# Patient Record
Sex: MALE | Race: WHITE | Employment: FULL TIME | URBAN - METROPOLITAN AREA
[De-identification: names, ages, dates, MRNs, and addresses within clinical notes are randomized per-mention and may not be internally consistent; named-entity substitution may affect disease eponyms.]

---

## 2017-02-07 PROCEDURE — 87086 URINE CULTURE/COLONY COUNT: CPT | Performed by: OBSTETRICS & GYNECOLOGY

## 2017-02-07 PROCEDURE — 81003 URINALYSIS AUTO W/O SCOPE: CPT | Performed by: INTERNAL MEDICINE

## 2017-09-22 ENCOUNTER — HOSPITAL ENCOUNTER (OUTPATIENT)
Age: 47
Discharge: HOME | End: 2017-09-22
Attending: EMERGENCY MEDICINE
Payer: COMMERCIAL

## 2017-09-22 VITALS
SYSTOLIC BLOOD PRESSURE: 137 MMHG | DIASTOLIC BLOOD PRESSURE: 81 MMHG | HEART RATE: 88 BPM | OXYGEN SATURATION: 98 % | RESPIRATION RATE: 18 BRPM | BODY MASS INDEX: 28.14 KG/M2 | HEIGHT: 69 IN | TEMPERATURE: 98 F | WEIGHT: 190 LBS

## 2017-09-22 DIAGNOSIS — T78.40XA ALLERGIC REACTION, INITIAL ENCOUNTER: Primary | ICD-10-CM

## 2017-09-22 PROCEDURE — 99214 OFFICE O/P EST MOD 30 MIN: CPT

## 2017-09-22 PROCEDURE — 99213 OFFICE O/P EST LOW 20 MIN: CPT

## 2017-09-22 RX ORDER — PREDNISONE 20 MG/1
40 TABLET ORAL DAILY
Qty: 10 TABLET | Refills: 0 | Status: SHIPPED | OUTPATIENT
Start: 2017-09-22 | End: 2017-09-27

## 2017-09-22 NOTE — ED PROVIDER NOTES
Patient Seen in: [facility]    History   Patient presents with:  Rash Skin Problem (integumentary)    Stated Complaint: RASH    HPI    This patient complains of a generalized rash which has occurred in the past day.   Mindy (36.5 °C)  Temp src: Oral  SpO2: 98 %  O2 Device: None (Room air)    Current:/81   Pulse 88   Temp 97.7 °F (36.5 °C) (Oral)   Resp 18   Ht 175.3 cm (5' 9\")   Wt 86.2 kg   SpO2 98%   BMI 28.06 kg/m²         Physical Exam    Patient is awake and alert

## 2018-02-27 PROCEDURE — 81003 URINALYSIS AUTO W/O SCOPE: CPT | Performed by: INTERNAL MEDICINE

## 2018-08-30 ENCOUNTER — APPOINTMENT (OUTPATIENT)
Dept: GENERAL RADIOLOGY | Facility: HOSPITAL | Age: 48
End: 2018-08-30
Attending: EMERGENCY MEDICINE
Payer: COMMERCIAL

## 2018-08-30 ENCOUNTER — HOSPITAL ENCOUNTER (OUTPATIENT)
Facility: HOSPITAL | Age: 48
Setting detail: OBSERVATION
Discharge: HOME | End: 2018-08-31
Attending: EMERGENCY MEDICINE | Admitting: HOSPITALIST
Payer: COMMERCIAL

## 2018-08-30 DIAGNOSIS — R77.8 ELEVATED TROPONIN: ICD-10-CM

## 2018-08-30 DIAGNOSIS — R07.9 ACUTE CHEST PAIN: Primary | ICD-10-CM

## 2018-08-30 PROBLEM — R79.89 ELEVATED TROPONIN: Status: ACTIVE | Noted: 2018-08-30

## 2018-08-30 LAB
ANION GAP SERPL CALC-SCNC: 10 MMOL/L (ref 0–18)
BASOPHILS # BLD: 0 K/UL (ref 0–0.2)
BASOPHILS NFR BLD: 1 %
BUN SERPL-MCNC: 12 MG/DL (ref 8–20)
BUN/CREAT SERPL: 11 (ref 10–20)
CALCIUM SERPL-MCNC: 9.6 MG/DL (ref 8.5–10.5)
CHLORIDE SERPL-SCNC: 104 MMOL/L (ref 95–110)
CHOLEST SERPL-MCNC: 172 MG/DL (ref 110–200)
CO2 SERPL-SCNC: 24 MMOL/L (ref 22–32)
CREAT SERPL-MCNC: 1.09 MG/DL (ref 0.5–1.5)
EOSINOPHIL # BLD: 0.1 K/UL (ref 0–0.7)
EOSINOPHIL NFR BLD: 1 %
ERYTHROCYTE [DISTWIDTH] IN BLOOD BY AUTOMATED COUNT: 13.5 % (ref 11–15)
GLUCOSE SERPL-MCNC: 136 MG/DL (ref 70–99)
HCT VFR BLD AUTO: 48 % (ref 41–52)
HDLC SERPL-MCNC: 46 MG/DL
HGB BLD-MCNC: 16.3 G/DL (ref 13.5–17.5)
LDLC SERPL CALC-MCNC: 99 MG/DL (ref 0–99)
LYMPHOCYTES # BLD: 2.1 K/UL (ref 1–4)
LYMPHOCYTES NFR BLD: 31 %
MCH RBC QN AUTO: 29.4 PG (ref 27–32)
MCHC RBC AUTO-ENTMCNC: 33.9 G/DL (ref 32–37)
MCV RBC AUTO: 86.9 FL (ref 80–100)
MONOCYTES # BLD: 0.5 K/UL (ref 0–1)
MONOCYTES NFR BLD: 7 %
NEUTROPHILS # BLD AUTO: 4.2 K/UL (ref 1.8–7.7)
NEUTROPHILS NFR BLD: 60 %
NONHDLC SERPL-MCNC: 126 MG/DL
OSMOLALITY UR CALC.SUM OF ELEC: 288 MOSM/KG (ref 275–295)
PLATELET # BLD AUTO: 216 K/UL (ref 140–400)
PMV BLD AUTO: 8.2 FL (ref 7.4–10.3)
POTASSIUM SERPL-SCNC: 3.8 MMOL/L (ref 3.3–5.1)
RBC # BLD AUTO: 5.52 M/UL (ref 4.5–5.9)
SODIUM SERPL-SCNC: 138 MMOL/L (ref 136–144)
TRIGL SERPL-MCNC: 134 MG/DL (ref 1–149)
TROPONIN I SERPL-MCNC: 0.03 NG/ML (ref ?–0.03)
TROPONIN I SERPL-MCNC: 0.04 NG/ML (ref ?–0.03)
WBC # BLD AUTO: 6.9 K/UL (ref 4–11)

## 2018-08-30 PROCEDURE — 99285 EMERGENCY DEPT VISIT HI MDM: CPT

## 2018-08-30 PROCEDURE — 93010 ELECTROCARDIOGRAM REPORT: CPT | Performed by: EMERGENCY MEDICINE

## 2018-08-30 PROCEDURE — 80048 BASIC METABOLIC PNL TOTAL CA: CPT | Performed by: EMERGENCY MEDICINE

## 2018-08-30 PROCEDURE — 84484 ASSAY OF TROPONIN QUANT: CPT | Performed by: EMERGENCY MEDICINE

## 2018-08-30 PROCEDURE — 71046 X-RAY EXAM CHEST 2 VIEWS: CPT | Performed by: EMERGENCY MEDICINE

## 2018-08-30 PROCEDURE — 83036 HEMOGLOBIN GLYCOSYLATED A1C: CPT | Performed by: HOSPITALIST

## 2018-08-30 PROCEDURE — 93005 ELECTROCARDIOGRAM TRACING: CPT

## 2018-08-30 PROCEDURE — 80061 LIPID PANEL: CPT | Performed by: EMERGENCY MEDICINE

## 2018-08-30 PROCEDURE — 85025 COMPLETE CBC W/AUTO DIFF WBC: CPT | Performed by: EMERGENCY MEDICINE

## 2018-08-30 PROCEDURE — 36415 COLL VENOUS BLD VENIPUNCTURE: CPT

## 2018-08-30 RX ORDER — SODIUM CHLORIDE 0.9 % (FLUSH) 0.9 %
3 SYRINGE (ML) INJECTION AS NEEDED
Status: DISCONTINUED | OUTPATIENT
Start: 2018-08-30 | End: 2018-08-31

## 2018-08-30 RX ORDER — PANTOPRAZOLE SODIUM 20 MG/1
20 TABLET, DELAYED RELEASE ORAL
Status: DISCONTINUED | OUTPATIENT
Start: 2018-08-31 | End: 2018-08-31

## 2018-08-30 RX ORDER — METOCLOPRAMIDE HYDROCHLORIDE 5 MG/ML
10 INJECTION INTRAMUSCULAR; INTRAVENOUS EVERY 8 HOURS PRN
Status: DISCONTINUED | OUTPATIENT
Start: 2018-08-30 | End: 2018-08-31

## 2018-08-30 RX ORDER — ACETAMINOPHEN 325 MG/1
650 TABLET ORAL EVERY 6 HOURS PRN
Status: DISCONTINUED | OUTPATIENT
Start: 2018-08-30 | End: 2018-08-31

## 2018-08-30 RX ORDER — ONDANSETRON 2 MG/ML
4 INJECTION INTRAMUSCULAR; INTRAVENOUS EVERY 6 HOURS PRN
Status: DISCONTINUED | OUTPATIENT
Start: 2018-08-30 | End: 2018-08-31

## 2018-08-30 RX ORDER — LISINOPRIL 10 MG/1
10 TABLET ORAL DAILY
Status: DISCONTINUED | OUTPATIENT
Start: 2018-08-31 | End: 2018-08-31

## 2018-08-30 RX ORDER — ASPIRIN 81 MG/1
81 TABLET, CHEWABLE ORAL DAILY
Status: DISCONTINUED | OUTPATIENT
Start: 2018-08-30 | End: 2018-08-31

## 2018-08-30 RX ORDER — ATORVASTATIN CALCIUM 40 MG/1
40 TABLET, FILM COATED ORAL NIGHTLY
Status: DISCONTINUED | OUTPATIENT
Start: 2018-08-30 | End: 2018-08-31

## 2018-08-30 RX ORDER — ASPIRIN 81 MG/1
324 TABLET, CHEWABLE ORAL ONCE
Status: COMPLETED | OUTPATIENT
Start: 2018-08-30 | End: 2018-08-30

## 2018-08-30 NOTE — ED PROVIDER NOTES
Patient Seen in: [facility] AND [facility] Emergency Department    History   Patient presents with:  Dizziness (neurologic)  Chest Pressure    Stated Complaint: Dizzy, chest pressure     HPI    Patient presents to the emergency department with complaint of today History   Problem Relation Age of Onset   • Cancer Father    • Hypertension Brother        Smoking status: Former Smoker                                                              Packs/day: 0.00      Years: 0.00         Types: Cigarettes     Quit date: extremities equally with good coordination. No cranial nerve asymmetry noted. Psychiatric:  Normal affect. Oriented. No unusual behavior. Interacting well.     It appears well at this time he is a non-smoker has history of hypertension reflux we will d health screening including reassessment of your blood pressure. Clinical Impression:  Acute chest pain  (primary encounter diagnosis)  Elevated troponin    Disposition:  There is no disposition on file for this visit.     Follow-up:  No follow-up provi

## 2018-08-30 NOTE — ED NOTES
Pt remains sitting on the bedside with cardiac monitoring. Pt denies having any complaints at this time.

## 2018-08-30 NOTE — ED INITIAL ASSESSMENT (HPI)
Pt to rm 40 in [facility], from triage and triaged at bedside for complaint of feeling dizzy when he bent over and then felt a sharp pain in chest, on arrival and on assessment pt denies dizziness and chest pain, states \"it was just a weird feeling I've never f

## 2018-08-30 NOTE — H&P
[facility] Hospitalist H&P     CC: Patient presents with:  Dizziness (neurologic)  Chest Pressure     PCP: Adam Dow MD    ASSESSMENT / PLAN:     Mr. Pati Espinoza is 51 yo M with PMH of HTN, GERD who presented with CP.     Atypical CP  - troponin mildly elevat prescriptions have been marked as taking for the 8/30/18 encounter [facility] Encounter).       Soc Hx     Smoking status: Former Smoker     Types: Cigarettes    Quit date: 4/5/2008    Smokeless tobacco: Never Used    Alcohol use Yes  3.6 oz/week    6 Cans o Dictated by (CST): Lalo Mayen MD on 8/30/2018 at 14:11     Approved by (CST): Lalo Mayen MD on 8/30/2018 at 14:13

## 2018-08-30 NOTE — H&P
ASSESSMENT/PLAN:    Impression: 1. Chest pressure in a 77-year-old male with a few risk factors and troponins are upper limits of normal.  2.  Hypertension    Recommendation is going be admitted. We are going to start him on aspirin and beta blockers. mg total) by mouth every morning before breakfast. Before meal Disp: 90 tablet Rfl: 3   lisinopril 10 MG Oral Tab TAKE 1 BY MOUTH DAILY Disp: 90 tablet Rfl: 3   clotrimazole 10 MG Mouth/Throat Jeremiah Take 1 tablet (10 mg total) by mouth 5 (five) times brigette SKIN:no rashes,lesions. NEURO/PSYCH:alert and oriented. Normal affect. CV: PALP:PMI not displaced; no lifts, thrills or rubs. AUSC: Regular rhythm, normal S1, S2 without S3; no murmur. CAROTIDS:carotid pulses normal. ABD AORTA:not enlarged.  Sherryn Cooks

## 2018-08-31 ENCOUNTER — APPOINTMENT (OUTPATIENT)
Dept: NUCLEAR MEDICINE | Facility: HOSPITAL | Age: 48
End: 2018-08-31
Attending: HOSPITALIST
Payer: COMMERCIAL

## 2018-08-31 ENCOUNTER — APPOINTMENT (OUTPATIENT)
Dept: CV DIAGNOSTICS | Facility: HOSPITAL | Age: 48
End: 2018-08-31
Attending: HOSPITALIST
Payer: COMMERCIAL

## 2018-08-31 VITALS
SYSTOLIC BLOOD PRESSURE: 117 MMHG | BODY MASS INDEX: 31.12 KG/M2 | OXYGEN SATURATION: 96 % | WEIGHT: 205.31 LBS | RESPIRATION RATE: 15 BRPM | DIASTOLIC BLOOD PRESSURE: 73 MMHG | HEART RATE: 62 BPM | HEIGHT: 68 IN | TEMPERATURE: 98 F

## 2018-08-31 PROBLEM — R73.03 PREDIABETES: Status: ACTIVE | Noted: 2018-08-31

## 2018-08-31 LAB
ANION GAP SERPL CALC-SCNC: 7 MMOL/L (ref 0–18)
BASOPHILS # BLD: 0.1 K/UL (ref 0–0.2)
BASOPHILS NFR BLD: 1 %
BUN SERPL-MCNC: 15 MG/DL (ref 8–20)
BUN/CREAT SERPL: 14.7 (ref 10–20)
CALCIUM SERPL-MCNC: 9.3 MG/DL (ref 8.5–10.5)
CHLORIDE SERPL-SCNC: 101 MMOL/L (ref 95–110)
CO2 SERPL-SCNC: 27 MMOL/L (ref 22–32)
CREAT SERPL-MCNC: 1.02 MG/DL (ref 0.5–1.5)
EOSINOPHIL # BLD: 0.2 K/UL (ref 0–0.7)
EOSINOPHIL NFR BLD: 3 %
ERYTHROCYTE [DISTWIDTH] IN BLOOD BY AUTOMATED COUNT: 13.3 % (ref 11–15)
GLUCOSE SERPL-MCNC: 104 MG/DL (ref 70–99)
HBA1C MFR BLD: 5.7 % (ref 4–6)
HCT VFR BLD AUTO: 46.6 % (ref 41–52)
HGB BLD-MCNC: 15.7 G/DL (ref 13.5–17.5)
LYMPHOCYTES # BLD: 3.4 K/UL (ref 1–4)
LYMPHOCYTES NFR BLD: 44 %
MCH RBC QN AUTO: 29.5 PG (ref 27–32)
MCHC RBC AUTO-ENTMCNC: 33.8 G/DL (ref 32–37)
MCV RBC AUTO: 87.3 FL (ref 80–100)
MONOCYTES # BLD: 0.7 K/UL (ref 0–1)
MONOCYTES NFR BLD: 9 %
NEUTROPHILS # BLD AUTO: 3.4 K/UL (ref 1.8–7.7)
NEUTROPHILS NFR BLD: 44 %
OSMOLALITY UR CALC.SUM OF ELEC: 281 MOSM/KG (ref 275–295)
PLATELET # BLD AUTO: 223 K/UL (ref 140–400)
PMV BLD AUTO: 8.3 FL (ref 7.4–10.3)
POTASSIUM SERPL-SCNC: 3.9 MMOL/L (ref 3.3–5.1)
RBC # BLD AUTO: 5.33 M/UL (ref 4.5–5.9)
SODIUM SERPL-SCNC: 135 MMOL/L (ref 136–144)
TROPONIN I SERPL-MCNC: 0.01 NG/ML (ref ?–0.03)
WBC # BLD AUTO: 7.7 K/UL (ref 4–11)

## 2018-08-31 PROCEDURE — 93017 CV STRESS TEST TRACING ONLY: CPT | Performed by: HOSPITALIST

## 2018-08-31 PROCEDURE — 84484 ASSAY OF TROPONIN QUANT: CPT | Performed by: HOSPITALIST

## 2018-08-31 PROCEDURE — 78452 HT MUSCLE IMAGE SPECT MULT: CPT | Performed by: HOSPITALIST

## 2018-08-31 PROCEDURE — 80048 BASIC METABOLIC PNL TOTAL CA: CPT | Performed by: HOSPITALIST

## 2018-08-31 PROCEDURE — 93016 CV STRESS TEST SUPVJ ONLY: CPT | Performed by: HOSPITALIST

## 2018-08-31 PROCEDURE — 85025 COMPLETE CBC W/AUTO DIFF WBC: CPT | Performed by: HOSPITALIST

## 2018-08-31 PROCEDURE — 93018 CV STRESS TEST I&R ONLY: CPT | Performed by: HOSPITALIST

## 2018-08-31 RX ORDER — SODIUM CHLORIDE 9 MG/ML
INJECTION, SOLUTION INTRAVENOUS
Status: COMPLETED
Start: 2018-08-31 | End: 2018-08-31

## 2018-08-31 NOTE — DISCHARGE SUMMARY
General Medicine Discharge Summary     Patient ID:  Lisa Ribera  50year old  1/26/1970    Admit date: 8/30/2018    Discharge date and time: 08/31/18    Attending Physician: Sol Dillard MD    Primary Care Physician:  Robson Loja MD     Reason f by (CST): Silverio Boucher MD on 8/30/2018 at 14:11     Approved by (CST): Silverio Boucher MD on 8/30/2018 at 14:13            Disposition: home    Home Medication Changes:          Medication List      CONTINUE taking these medications    CALCIUM 600 1500 (6

## 2018-08-31 NOTE — PROGRESS NOTES
ASSESSMENT/PLAN:     imp:  1. Elevated trop; now normal with normal stess  rec no further cardiac rec.  Ok to [employer]  Please call with any questions    [unfilled]      ------------------------------------------------------------------------------------- (1.727 m)   Wt 205 lb 4.8 oz (93.1 kg)   SpO2 96%   BMI 31.22 kg/m²   Wt Readings from Last 3 Encounters:  08/30/18 : 205 lb 4.8 oz (93.1 kg)  02/27/18 : 201 lb 3.2 oz (91.3 kg)  09/22/17 : 190 lb (86.2 kg)    CONS: well developed, well nourished.  WEIGHT:d

## 2018-10-26 ENCOUNTER — HOSPITAL ENCOUNTER (OUTPATIENT)
Age: 48
Discharge: HOME | End: 2018-10-26
Payer: COMMERCIAL

## 2018-10-26 VITALS
BODY MASS INDEX: 30.36 KG/M2 | HEIGHT: 69 IN | HEART RATE: 81 BPM | DIASTOLIC BLOOD PRESSURE: 79 MMHG | WEIGHT: 205 LBS | OXYGEN SATURATION: 97 % | RESPIRATION RATE: 18 BRPM | TEMPERATURE: 98 F | SYSTOLIC BLOOD PRESSURE: 142 MMHG

## 2018-10-26 DIAGNOSIS — J06.9 VIRAL URI WITH COUGH: Primary | ICD-10-CM

## 2018-10-26 PROCEDURE — 87430 STREP A AG IA: CPT

## 2018-10-26 PROCEDURE — 99212 OFFICE O/P EST SF 10 MIN: CPT

## 2018-10-26 PROCEDURE — 99213 OFFICE O/P EST LOW 20 MIN: CPT

## 2018-10-26 NOTE — ED PROVIDER NOTES
Patient Seen in: [facility]    History   Patient presents with:  Cough/URI    Stated Complaint: Sore Throat/Congestion    HPI    Patient is a 68-year-old male who presents for evaluation of sore throat, cough and congesti other systems reviewed and negative except as noted above.     Physical Exam     ED Triage Vitals [10/26/18 0900]   /79   Pulse 81   Resp 18   Temp 98 °F (36.7 °C)   Temp src Oral   SpO2 97 %   O2 Device None (Room air)       Current:/79   Pulse MD  [address] [facility] [address]  [phone]    Schedule an appointment as soon as possible for a visit   2-3 days or go to ER for worsening symptoms        Medications Prescribed:  Current Discharge Medication List

## 2018-10-26 NOTE — ED INITIAL ASSESSMENT (HPI)
PATIENT REPORTS PAINFUL SWALLOW, PRODUCTIVE COUGH X 2 DAYS. DENIES FEVERS AT HOME. PATIENT TOOK OTC DAYQUIL AND NYQUIL WITHOUT SIGNIFICANT RELIEF IN SYMPTOMS.

## 2019-12-11 PROBLEM — M25.522 LEFT ELBOW PAIN: Status: ACTIVE | Noted: 2019-12-11

## 2020-02-02 ENCOUNTER — HOSPITAL ENCOUNTER (OUTPATIENT)
Age: 50
Discharge: HOME | End: 2020-02-02
Payer: COMMERCIAL

## 2020-02-02 VITALS
TEMPERATURE: 98 F | DIASTOLIC BLOOD PRESSURE: 79 MMHG | HEART RATE: 64 BPM | BODY MASS INDEX: 30 KG/M2 | SYSTOLIC BLOOD PRESSURE: 123 MMHG | WEIGHT: 205 LBS | RESPIRATION RATE: 18 BRPM | OXYGEN SATURATION: 99 %

## 2020-02-02 DIAGNOSIS — H00.012 HORDEOLUM EXTERNUM OF RIGHT LOWER EYELID: Primary | ICD-10-CM

## 2020-02-02 PROCEDURE — 99213 OFFICE O/P EST LOW 20 MIN: CPT

## 2020-02-02 RX ORDER — POLYMYXIN B SULFATE AND TRIMETHOPRIM 1; 10000 MG/ML; [USP'U]/ML
1 SOLUTION OPHTHALMIC
Qty: 10 ML | Refills: 0 | Status: SHIPPED | OUTPATIENT
Start: 2020-02-02 | End: 2020-02-07

## 2020-02-02 NOTE — ED INITIAL ASSESSMENT (HPI)
Pt to IC with redness and irritation to right eye starting yesterday. Pt states he 'feels like he has something in his eye'. Denies itching. Eye with occasional yellow colored drainage.

## 2020-02-02 NOTE — ED PROVIDER NOTES
Patient presents with:  Eye Problem      HPI:     Omari Jain is a 48year old male with a history of HTN, GERD presents with a chief complaint R eye redness and drainage which started yesterday. Also c/o foreign body sensation to the eye.  States he al right lower eyelid H00.012        All results reviewed and discussed with patient. See AVS for detailed discharge instructions for your condition today.     Follow Up with:  Dolly Fabry, [address]  [phone]

## 2020-03-09 ENCOUNTER — HOSPITAL ENCOUNTER (OUTPATIENT)
Age: 50
Discharge: HOME | End: 2020-03-09
Attending: EMERGENCY MEDICINE
Payer: COMMERCIAL

## 2020-03-09 ENCOUNTER — APPOINTMENT (OUTPATIENT)
Dept: GENERAL RADIOLOGY | Age: 50
End: 2020-03-09
Attending: EMERGENCY MEDICINE
Payer: COMMERCIAL

## 2020-03-09 VITALS
OXYGEN SATURATION: 97 % | BODY MASS INDEX: 29.62 KG/M2 | HEIGHT: 69 IN | RESPIRATION RATE: 20 BRPM | SYSTOLIC BLOOD PRESSURE: 119 MMHG | WEIGHT: 200 LBS | TEMPERATURE: 99 F | HEART RATE: 105 BPM | DIASTOLIC BLOOD PRESSURE: 67 MMHG

## 2020-03-09 DIAGNOSIS — J20.8 ACUTE VIRAL BRONCHITIS: Primary | ICD-10-CM

## 2020-03-09 DIAGNOSIS — J06.9 VIRAL UPPER RESPIRATORY TRACT INFECTION WITH COUGH: ICD-10-CM

## 2020-03-09 LAB
POCT INFLUENZA A: NEGATIVE
POCT INFLUENZA B: NEGATIVE

## 2020-03-09 PROCEDURE — 71046 X-RAY EXAM CHEST 2 VIEWS: CPT | Performed by: EMERGENCY MEDICINE

## 2020-03-09 PROCEDURE — 99214 OFFICE O/P EST MOD 30 MIN: CPT

## 2020-03-09 PROCEDURE — 99213 OFFICE O/P EST LOW 20 MIN: CPT

## 2020-03-09 PROCEDURE — 87502 INFLUENZA DNA AMP PROBE: CPT | Performed by: EMERGENCY MEDICINE

## 2020-03-09 NOTE — ED PROVIDER NOTES
Patient Seen in: [facility]      History   Patient presents with:  Viral Syndrome    Stated Complaint: cough, chest congestion, itchy throat, runny nose and low grade fever    HPI    The patient is a 70-year-old male with HPI.  Constitutional and vital signs reviewed. All other systems reviewed and negative except as noted above.     Physical Exam     ED Triage Vitals [03/09/20 1229]   /67   Pulse 105   Resp 20   Temp 99.3 °F (37.4 °C)   Temp src Oral   SpO2 97 % week  For Recheck        Medications Prescribed:  Current Discharge Medication List

## 2020-12-28 PROBLEM — R79.89 ELEVATED TROPONIN: Status: RESOLVED | Noted: 2018-08-30 | Resolved: 2020-12-28

## 2020-12-28 PROBLEM — R07.9 ACUTE CHEST PAIN: Status: RESOLVED | Noted: 2018-08-30 | Resolved: 2020-12-28

## 2020-12-28 PROBLEM — R77.8 ELEVATED TROPONIN: Status: RESOLVED | Noted: 2018-08-30 | Resolved: 2020-12-28

## 2021-11-20 ENCOUNTER — HOSPITAL ENCOUNTER (OUTPATIENT)
Age: 51
Discharge: HOME | End: 2021-11-20
Payer: COMMERCIAL

## 2021-11-20 VITALS
DIASTOLIC BLOOD PRESSURE: 79 MMHG | RESPIRATION RATE: 18 BRPM | TEMPERATURE: 98 F | OXYGEN SATURATION: 98 % | HEART RATE: 70 BPM | SYSTOLIC BLOOD PRESSURE: 125 MMHG

## 2021-11-20 DIAGNOSIS — R05.9 COUGH: Primary | ICD-10-CM

## 2021-11-20 PROCEDURE — 99213 OFFICE O/P EST LOW 20 MIN: CPT

## 2021-11-20 RX ORDER — FLUTICASONE PROPIONATE 50 MCG
2 SPRAY, SUSPENSION (ML) NASAL DAILY
Qty: 16 G | Refills: 0 | Status: SHIPPED | OUTPATIENT
Start: 2021-11-20 | End: 2021-12-20

## 2021-11-20 RX ORDER — CODEINE PHOSPHATE AND GUAIFENESIN 10; 100 MG/5ML; MG/5ML
5 SOLUTION ORAL EVERY 6 HOURS PRN
Qty: 180 ML | Refills: 0 | Status: SHIPPED | OUTPATIENT
Start: 2021-11-20

## 2021-11-20 NOTE — ED PROVIDER NOTES
Patient Seen in: [facility]      History   Patient presents with:  Cough/URI    Stated Complaint: cough    Subjective:   HPI    45 yo male otherwise healthy arrives to the ic with co cough, initially with pnd and congestion now coughing at ni Vitals [11/20/21 0818]   /79   Pulse 70   Resp 18   Temp 97.6 °F (36.4 °C)   Temp src Temporal   SpO2 98 %   O2 Device None (Room air)       Current:/79   Pulse 70   Temp 97.6 °F (36.4 °C) (Temporal)   Resp 18   SpO2 98%         Physical Exam review, obtaining history, performing a physical exam, bedside monitoring of interventions, collecting and independently interpreting tests, discussion with consultants, patient communication/counseling, and chart documentation but not including time spent

## 2022-05-23 ENCOUNTER — HOSPITAL ENCOUNTER (OUTPATIENT)
Age: 52
Discharge: HOME | End: 2022-05-23
Attending: EMERGENCY MEDICINE
Payer: COMMERCIAL

## 2022-05-23 VITALS
RESPIRATION RATE: 18 BRPM | SYSTOLIC BLOOD PRESSURE: 140 MMHG | HEART RATE: 67 BPM | TEMPERATURE: 98 F | DIASTOLIC BLOOD PRESSURE: 88 MMHG | OXYGEN SATURATION: 98 %

## 2022-05-23 DIAGNOSIS — J06.9 VIRAL URI: Primary | ICD-10-CM

## 2022-05-23 LAB — SARS-COV-2 RNA RESP QL NAA+PROBE: NOT DETECTED

## 2022-05-23 PROCEDURE — 99212 OFFICE O/P EST SF 10 MIN: CPT

## 2022-08-11 ENCOUNTER — HOSPITAL ENCOUNTER (OUTPATIENT)
Age: 52
Discharge: HOME | End: 2022-08-11
Attending: EMERGENCY MEDICINE
Payer: COMMERCIAL

## 2022-08-11 VITALS
HEART RATE: 64 BPM | TEMPERATURE: 98 F | RESPIRATION RATE: 18 BRPM | OXYGEN SATURATION: 97 % | SYSTOLIC BLOOD PRESSURE: 131 MMHG | DIASTOLIC BLOOD PRESSURE: 82 MMHG

## 2022-08-11 DIAGNOSIS — T63.481A INSECT STINGS, ACCIDENTAL OR UNINTENTIONAL, INITIAL ENCOUNTER: Primary | ICD-10-CM

## 2022-08-11 PROCEDURE — 99213 OFFICE O/P EST LOW 20 MIN: CPT

## 2022-08-11 PROCEDURE — 99212 OFFICE O/P EST SF 10 MIN: CPT

## 2022-08-11 NOTE — ED INITIAL ASSESSMENT (HPI)
Pt comes in for evaluation of bee sting to forehead yesterday morning. Reports that he ran over a bee nest while mowing. Reports 2 stings to forehead around 9 am. Reports taking a benadryl around noon yesterday. Denies trouble breathing, tongue swelling. Reports mild local itching. Swelling noted to mid forehead and around eyes.

## 2023-03-09 ENCOUNTER — APPOINTMENT (OUTPATIENT)
Dept: CT IMAGING | Age: 53
End: 2023-03-09
Attending: EMERGENCY MEDICINE
Payer: COMMERCIAL

## 2023-03-09 ENCOUNTER — HOSPITAL ENCOUNTER (OUTPATIENT)
Age: 53
Discharge: HOME | End: 2023-03-09
Attending: EMERGENCY MEDICINE
Payer: COMMERCIAL

## 2023-03-09 VITALS
SYSTOLIC BLOOD PRESSURE: 132 MMHG | RESPIRATION RATE: 20 BRPM | TEMPERATURE: 99 F | HEART RATE: 78 BPM | DIASTOLIC BLOOD PRESSURE: 86 MMHG | OXYGEN SATURATION: 99 %

## 2023-03-09 DIAGNOSIS — R31.9 HEMATURIA, UNSPECIFIED TYPE: ICD-10-CM

## 2023-03-09 DIAGNOSIS — K63.89 EPIPLOIC APPENDAGITIS: Primary | ICD-10-CM

## 2023-03-09 LAB
#MXD IC: 0.7 X10ˆ3/UL (ref 0.1–1)
BILIRUB UR QL STRIP: NEGATIVE
BUN BLD-MCNC: 14 MG/DL (ref 7–18)
CHLORIDE BLD-SCNC: 101 MMOL/L (ref 98–112)
CLARITY UR: CLEAR
CO2 BLD-SCNC: 27 MMOL/L (ref 21–32)
COLOR UR: YELLOW
CREAT BLD-MCNC: 1 MG/DL
GFR SERPLBLD BASED ON 1.73 SQ M-ARVRAT: 90 ML/MIN/1.73M2 (ref 60–?)
GLUCOSE BLD-MCNC: 136 MG/DL (ref 70–99)
GLUCOSE UR STRIP-MCNC: NEGATIVE MG/DL
HCT VFR BLD AUTO: 52.4 %
HCT VFR BLD CALC: 55 %
HGB BLD-MCNC: 17.6 G/DL
ISTAT IONIZED CALCIUM FOR CHEM 8: 1.28 MMOL/L (ref 1.12–1.32)
KETONES UR STRIP-MCNC: NEGATIVE MG/DL
LEUKOCYTE ESTERASE UR QL STRIP: NEGATIVE
LYMPHOCYTES # BLD AUTO: 1.9 X10ˆ3/UL (ref 1–4)
LYMPHOCYTES NFR BLD AUTO: 23.6 %
MCH RBC QN AUTO: 28.9 PG (ref 26–34)
MCHC RBC AUTO-ENTMCNC: 33.6 G/DL (ref 31–37)
MCV RBC AUTO: 86 FL (ref 80–100)
MIXED CELL %: 9.3 %
NEUTROPHILS # BLD AUTO: 5.4 X10ˆ3/UL (ref 1.5–7.7)
NEUTROPHILS NFR BLD AUTO: 67.1 %
NITRITE UR QL STRIP: NEGATIVE
PH UR STRIP: 5 [PH]
PLATELET # BLD AUTO: 225 X10ˆ3/UL (ref 150–450)
POTASSIUM BLD-SCNC: 4.3 MMOL/L (ref 3.6–5.1)
PROT UR STRIP-MCNC: NEGATIVE MG/DL
RBC # BLD AUTO: 6.09 X10ˆ6/UL
SODIUM BLD-SCNC: 140 MMOL/L (ref 136–145)
SP GR UR STRIP: 1.02
UROBILINOGEN UR STRIP-ACNC: <2 MG/DL
WBC # BLD AUTO: 8 X10ˆ3/UL (ref 4–11)

## 2023-03-09 PROCEDURE — 74177 CT ABD & PELVIS W/CONTRAST: CPT | Performed by: EMERGENCY MEDICINE

## 2023-03-09 PROCEDURE — 81002 URINALYSIS NONAUTO W/O SCOPE: CPT

## 2023-03-09 PROCEDURE — 99214 OFFICE O/P EST MOD 30 MIN: CPT

## 2023-03-09 PROCEDURE — 80047 BASIC METABLC PNL IONIZED CA: CPT

## 2023-03-09 PROCEDURE — 36415 COLL VENOUS BLD VENIPUNCTURE: CPT

## 2023-03-09 PROCEDURE — 85025 COMPLETE CBC W/AUTO DIFF WBC: CPT | Performed by: EMERGENCY MEDICINE

## 2023-03-09 NOTE — DISCHARGE INSTRUCTIONS
Thank you for visiting our immediate care for your health care needs. Please follow up with your regular doctor in the next 1-2 days. If you have any additional problems please return to the immediate care. Please take Tylenol and or Motrin for pain.

## 2023-03-09 NOTE — ED INITIAL ASSESSMENT (HPI)
Presents with 4 days of LLQ pain. No nausea. No fever. No vomiting or diarrhea. Eating and drinking normally. Denies urinary symptoms.  Also states he just started a new work out program.

## 2025-07-25 ENCOUNTER — APPOINTMENT (OUTPATIENT)
Dept: CV DIAGNOSTICS | Facility: HOSPITAL | Age: 55
End: 2025-07-25
Attending: STUDENT IN AN ORGANIZED HEALTH CARE EDUCATION/TRAINING PROGRAM

## 2025-07-25 ENCOUNTER — HOSPITAL ENCOUNTER (INPATIENT)
Facility: HOSPITAL | Age: 55
LOS: 1 days | Discharge: HOME | End: 2025-07-25
Attending: EMERGENCY MEDICINE | Admitting: STUDENT IN AN ORGANIZED HEALTH CARE EDUCATION/TRAINING PROGRAM

## 2025-07-25 ENCOUNTER — HOSPITAL ENCOUNTER (OUTPATIENT)
Facility: HOSPITAL | Age: 55
Setting detail: OBSERVATION
Discharge: HOME | End: 2025-07-25
Attending: EMERGENCY MEDICINE | Admitting: STUDENT IN AN ORGANIZED HEALTH CARE EDUCATION/TRAINING PROGRAM

## 2025-07-25 VITALS
BODY MASS INDEX: 30.9 KG/M2 | HEIGHT: 69 IN | SYSTOLIC BLOOD PRESSURE: 111 MMHG | RESPIRATION RATE: 18 BRPM | WEIGHT: 208.63 LBS | TEMPERATURE: 97 F | OXYGEN SATURATION: 97 % | DIASTOLIC BLOOD PRESSURE: 76 MMHG | HEART RATE: 76 BPM

## 2025-07-25 DIAGNOSIS — I47.10 SVT (SUPRAVENTRICULAR TACHYCARDIA) (HCC): Primary | ICD-10-CM

## 2025-07-25 LAB
ANION GAP SERPL CALC-SCNC: 10 MMOL/L (ref 0–18)
ATRIAL RATE: 112 BPM
ATRIAL RATE: 166 BPM
BASOPHILS # BLD AUTO: 0.07 X10(3) UL (ref 0–0.2)
BASOPHILS NFR BLD AUTO: 0.8 %
BUN BLD-MCNC: 16 MG/DL (ref 9–23)
BUN/CREAT SERPL: 13.9 (ref 10–20)
CALCIUM BLD-MCNC: 9.8 MG/DL (ref 8.7–10.4)
CHLORIDE SERPL-SCNC: 106 MMOL/L (ref 98–112)
CO2 SERPL-SCNC: 24 MMOL/L (ref 21–32)
CREAT BLD-MCNC: 1.15 MG/DL (ref 0.7–1.3)
DEPRECATED RDW RBC AUTO: 37.4 FL (ref 35.1–46.3)
EGFRCR SERPLBLD CKD-EPI 2021: 75 ML/MIN/1.73M2 (ref 60–?)
EOSINOPHIL # BLD AUTO: 0.19 X10(3) UL (ref 0–0.7)
EOSINOPHIL NFR BLD AUTO: 2.2 %
ERYTHROCYTE [DISTWIDTH] IN BLOOD BY AUTOMATED COUNT: 12.3 % (ref 11–15)
GLUCOSE BLD-MCNC: 128 MG/DL (ref 70–99)
HCT VFR BLD AUTO: 47.7 % (ref 39–53)
HGB BLD-MCNC: 17.1 G/DL (ref 13–17.5)
IMM GRANULOCYTES # BLD AUTO: 0.03 X10(3) UL (ref 0–1)
IMM GRANULOCYTES NFR BLD: 0.3 %
LYMPHOCYTES # BLD AUTO: 3.75 X10(3) UL (ref 1–4)
LYMPHOCYTES NFR BLD AUTO: 42.6 %
MAGNESIUM SERPL-MCNC: 1.8 MG/DL (ref 1.6–2.6)
MCH RBC QN AUTO: 30.3 PG (ref 26–34)
MCHC RBC AUTO-ENTMCNC: 35.8 G/DL (ref 31–37)
MCV RBC AUTO: 84.4 FL (ref 80–100)
MONOCYTES # BLD AUTO: 0.92 X10(3) UL (ref 0.1–1)
MONOCYTES NFR BLD AUTO: 10.4 %
NEUTROPHILS # BLD AUTO: 3.85 X10 (3) UL (ref 1.5–7.7)
NEUTROPHILS # BLD AUTO: 3.85 X10(3) UL (ref 1.5–7.7)
NEUTROPHILS NFR BLD AUTO: 43.7 %
OSMOLALITY SERPL CALC.SUM OF ELEC: 293 MOSM/KG (ref 275–295)
P AXIS: 44 DEGREES
P-R INTERVAL: 168 MS
PLATELET # BLD AUTO: 240 10(3)UL (ref 150–450)
POTASSIUM SERPL-SCNC: 3.8 MMOL/L (ref 3.5–5.1)
Q-T INTERVAL: 284 MS
Q-T INTERVAL: 292 MS
Q-T INTERVAL: 298 MS
QRS DURATION: 72 MS
QRS DURATION: 78 MS
QRS DURATION: 80 MS
QTC CALCULATION (BEZET): 387 MS
QTC CALCULATION (BEZET): 426 MS
QTC CALCULATION (BEZET): 485 MS
R AXIS: 140 DEGREES
R AXIS: 84 DEGREES
R AXIS: 86 DEGREES
RBC # BLD AUTO: 5.65 X10(6)UL (ref 4.3–5.7)
SODIUM SERPL-SCNC: 140 MMOL/L (ref 136–145)
T AXIS: 34 DEGREES
T AXIS: 45 DEGREES
T AXIS: 91 DEGREES
TROPONIN I SERPL HS-MCNC: 8 NG/L (ref ?–53)
VENTRICULAR RATE: 112 BPM
VENTRICULAR RATE: 123 BPM
VENTRICULAR RATE: 166 BPM
WBC # BLD AUTO: 8.8 X10(3) UL (ref 4–11)

## 2025-07-25 PROCEDURE — 93306 TTE W/DOPPLER COMPLETE: CPT | Performed by: STUDENT IN AN ORGANIZED HEALTH CARE EDUCATION/TRAINING PROGRAM

## 2025-07-25 PROCEDURE — 85025 COMPLETE CBC W/AUTO DIFF WBC: CPT | Performed by: EMERGENCY MEDICINE

## 2025-07-25 PROCEDURE — 96375 TX/PRO/DX INJ NEW DRUG ADDON: CPT

## 2025-07-25 PROCEDURE — 99285 EMERGENCY DEPT VISIT HI MDM: CPT

## 2025-07-25 PROCEDURE — 93005 ELECTROCARDIOGRAM TRACING: CPT

## 2025-07-25 PROCEDURE — 99291 CRITICAL CARE FIRST HOUR: CPT

## 2025-07-25 PROCEDURE — 96374 THER/PROPH/DIAG INJ IV PUSH: CPT

## 2025-07-25 PROCEDURE — 80048 BASIC METABOLIC PNL TOTAL CA: CPT | Performed by: EMERGENCY MEDICINE

## 2025-07-25 PROCEDURE — 93010 ELECTROCARDIOGRAM REPORT: CPT

## 2025-07-25 PROCEDURE — 76376 3D RENDER W/INTRP POSTPROCES: CPT | Performed by: STUDENT IN AN ORGANIZED HEALTH CARE EDUCATION/TRAINING PROGRAM

## 2025-07-25 PROCEDURE — 83735 ASSAY OF MAGNESIUM: CPT | Performed by: EMERGENCY MEDICINE

## 2025-07-25 PROCEDURE — 84484 ASSAY OF TROPONIN QUANT: CPT | Performed by: EMERGENCY MEDICINE

## 2025-07-25 RX ORDER — METOPROLOL SUCCINATE 25 MG/1
25 TABLET, EXTENDED RELEASE ORAL
Qty: 30 TABLET | Refills: 0 | Status: SHIPPED | OUTPATIENT
Start: 2025-07-26

## 2025-07-25 RX ORDER — METOPROLOL TARTRATE 25 MG/1
25 TABLET, FILM COATED ORAL ONCE
Status: COMPLETED | OUTPATIENT
Start: 2025-07-25 | End: 2025-07-25

## 2025-07-25 RX ORDER — METOPROLOL TARTRATE 1 MG/ML
5 INJECTION, SOLUTION INTRAVENOUS ONCE
Status: COMPLETED | OUTPATIENT
Start: 2025-07-25 | End: 2025-07-25

## 2025-07-25 RX ORDER — HYDROCODONE BITARTRATE AND ACETAMINOPHEN 5; 325 MG/1; MG/1
1 TABLET ORAL EVERY 4 HOURS PRN
Status: DISCONTINUED | OUTPATIENT
Start: 2025-07-25 | End: 2025-07-25

## 2025-07-25 RX ORDER — DILTIAZEM HYDROCHLORIDE 5 MG/ML
20 INJECTION INTRAVENOUS ONCE
Status: COMPLETED | OUTPATIENT
Start: 2025-07-25 | End: 2025-07-25

## 2025-07-25 RX ORDER — PANTOPRAZOLE SODIUM 40 MG/1
40 TABLET, DELAYED RELEASE ORAL
Status: DISCONTINUED | OUTPATIENT
Start: 2025-07-25 | End: 2025-07-25

## 2025-07-25 RX ORDER — MULTIVITAMIN
1 TABLET ORAL DAILY
COMMUNITY

## 2025-07-25 RX ORDER — ADENOSINE 3 MG/ML
6 INJECTION, SOLUTION INTRAVENOUS ONCE
Status: COMPLETED | OUTPATIENT
Start: 2025-07-25 | End: 2025-07-25

## 2025-07-25 RX ORDER — METOPROLOL SUCCINATE 25 MG/1
25 TABLET, EXTENDED RELEASE ORAL
Status: DISCONTINUED | OUTPATIENT
Start: 2025-07-25 | End: 2025-07-25

## 2025-07-25 RX ORDER — ACETAMINOPHEN 325 MG/1
650 TABLET ORAL EVERY 4 HOURS PRN
Status: DISCONTINUED | OUTPATIENT
Start: 2025-07-25 | End: 2025-07-25

## 2025-07-25 RX ORDER — HYDROCODONE BITARTRATE AND ACETAMINOPHEN 5; 325 MG/1; MG/1
2 TABLET ORAL EVERY 4 HOURS PRN
Status: DISCONTINUED | OUTPATIENT
Start: 2025-07-25 | End: 2025-07-25

## 2025-07-25 RX ORDER — METOPROLOL TARTRATE 1 MG/ML
INJECTION, SOLUTION INTRAVENOUS
Status: COMPLETED
Start: 2025-07-25 | End: 2025-07-25

## 2025-07-25 RX ORDER — LISINOPRIL 10 MG/1
10 TABLET ORAL DAILY
Status: DISCONTINUED | OUTPATIENT
Start: 2025-07-25 | End: 2025-07-25

## 2025-07-25 RX ORDER — MAGNESIUM OXIDE 400 MG/1
400 TABLET ORAL ONCE
Status: COMPLETED | OUTPATIENT
Start: 2025-07-25 | End: 2025-07-25

## 2025-07-25 RX ORDER — ENOXAPARIN SODIUM 100 MG/ML
40 INJECTION SUBCUTANEOUS DAILY
Status: DISCONTINUED | OUTPATIENT
Start: 2025-07-25 | End: 2025-07-25

## 2025-07-25 RX ORDER — POTASSIUM CHLORIDE 1500 MG/1
40 TABLET, EXTENDED RELEASE ORAL ONCE
Status: COMPLETED | OUTPATIENT
Start: 2025-07-25 | End: 2025-07-25

## 2025-07-25 RX ORDER — ADENOSINE 3 MG/ML
INJECTION, SOLUTION INTRAVENOUS
Status: COMPLETED
Start: 2025-07-25 | End: 2025-07-25

## 2025-07-25 RX ORDER — DILTIAZEM HYDROCHLORIDE 5 MG/ML
INJECTION INTRAVENOUS
Status: COMPLETED
Start: 2025-07-25 | End: 2025-07-25

## 2025-07-25 RX ORDER — ADENOSINE 3 MG/ML
12 INJECTION, SOLUTION INTRAVENOUS ONCE
Status: COMPLETED | OUTPATIENT
Start: 2025-07-25 | End: 2025-07-25

## 2025-07-25 NOTE — CONSULTS
[facility]  Tonio Bill MD  ?  Patient: Joel Segal  MRN: [id number]     Primary Care Physician: Dr. Jillian Helm MD  Referring Physician : Dr. North Lawson MD  Reason for Consultation: SVT     HPI:  Joel Segal is a 55 year old gentleman with history of hypertension who presented with palpitations.  The patient states that he awoke around 1 AM this morning and found that his heart was beating quite rapidly.  He states that he waited for symptoms to resolve however they failed to do so.  When he maci up out of bed, he did feel somewhat warm and lightheaded however did not fall or have any syncopal episodes.  He denies any associated symptoms of chest discomfort or shortness of breath.  Symptoms continue to persist ultimately prompting him to seek medical attention.    In the emergency room, initial blood pressure was 146/121.  Heart rate of 163.  Saturations were stable on room air.     ECG showed narrow complex tachycardia of 166.  Several rounds of adenosine were administered however the arrhythmia failed to terminate (rhythm strips available for review).  Ultimately, he was given diltiazem along with metoprolol with which he finally converted back to sinus.  Of note, subsequent ECGs did show periods transient  2 to 1 (unclear if underlying atrial flutter).  Presently, he remains in sinus and is without complaint.    He states that he has not previously experienced these types of symptoms.  He does not smoke but does drink socially.  He has been somewhat under increased stress due to an upcoming wedding.  He works in maintenance.  He also volunteers as a hockey .      ----------------------------------------------------------  A/P:  Joel Seagl with a history of hypertension who presents with palpitations.    Palpitations  - Narrow complex tachycardia noted rate 166 (possible SVT however atrial flutter cannot be ruled out)  -Did not respond to adenosine however did  convert back to sinus following metoprolol/diltiazem  -Subsequent ECG showing transient episode of 2-1 conduction with possible underlying flutter  -Relatively low [id number] -would not recommend OAC    Plan  1.  Continue to monitor on telemetry  2.  Start low-dose beta-blocker  3.  Echocardiogram  4.  Recommend EP study with anticipated ablation as outpatient    Thank you for allowing me to participate in the care of your patient.    MD [facility]    -----------------------------------------------------------------------------            Past Medical History[1]  Principal Problem:    SVT (supraventricular tachycardia) (HCC)          Medications:  .Medications Ordered Prior to Encounter[2]  Allergies: Allergies[3]  Social History     Socioeconomic History    Marital status:      Spouse name: Not on file    Number of children: Not on file    Years of education: Not on file    Highest education level: Not on file   Occupational History    Not on file   Tobacco Use    Smoking status: Former     Current packs/day: 0.00     Types: Cigarettes     Quit date: 2008     Years since quittin.3    Smokeless tobacco: Never   Vaping Use    Vaping status: Never Used   Substance and Sexual Activity    Alcohol use: Yes     Alcohol/week: 6.0 standard drinks of alcohol     Types: 6 Cans of beer per week     Comment: occ    Drug use: No    Sexual activity: Not on file   Other Topics Concern    Not on file   Social History Narrative    Not on file     Social Drivers of Health     Food Insecurity: No Food Insecurity (2025)    NCSS - Food Insecurity     Worried About Running Out of Food in the Last Year: No     Ran Out of Food in the Last Year: No   Transportation Needs: No Transportation Needs (2025)    NCSS - Transportation     Lack of Transportation: No   Housing Stability: Not At Risk (2025)    NCSS - Housing/Utilities     Has Housing: Yes     Worried About Losing  Housing: No     Unable to Get Utilities: No     Family History[4]  SH and FH were reviewed and updated.     Review of Systems  Constitutional: negative for fatigue, negative for change in appetite, chills, fevers, sweats (diaphoresis), weight loss or weight gain     Eyes: negative for irritation, redness and visual disturbance     Ears, nose, mouth, throat, and face: negative for hearing loss and sinus problems; negative for dental problems, negative for epistaxis, nasal congestion and sore throat     Respiratory: negative for cough, hemoptysis, pleurisy/chest pain, sputum and wheezing; negative for dyspnea upon exertion     Cardiovascular: See HPI     Gastrointestinal: negative for vomiting, nausea, heartburn(sensation) and abdominal bloating. Negative for diarrhea, constipation or melena     Genitourinary:negative for dysuria and hematuria     Integumentary: negative for rash and skin lesion(s)     Hematologic/lymphatic: negative for bleeding,easy bruising and lymphadenopathy     Musculoskeletal:negative for back pain, myalgias and muscle cramps/weakness     Neurological: negative for gait/coordination problems, headaches, memory problems, seizure, vertigo, dizziness and lightheadedness     Behavioral/Psych: negative for anxiety and depression     Endocrine: negative for diabetic symptoms including blurry vision, increased fatigue, polydipsia and polyuria and temperature intolerance     Allergic/Immunologic: negative for anaphylaxis, hay fever and pruritus     Physical Exam:  Blood pressure 111/79, pulse 70, temperature 97.8 °F (36.6 °C), temperature source Oral, resp. rate 19, height 5' 9\" (1.753 m), weight 208 lb 9.6 oz (94.6 kg), SpO2 95%.  ?  Alert and Oriented times x 3, pleasant, no distress, conversant, appears stated age  PER, Anicteric sclerae, moist conjunctivae, no eye lid lag present, EOM's intact  Lips, mucosa, and tongue normal; teeth and gums normal,  Trachea midline, no thyromegaly, no cervical  lymphadenopathy  No JVD or No carotid bruits  Lungs are CTAB, no wheezes or crackles, normal respiratory effort  Heart exam is regular, no murmurs or S3, PMI is non-displaced  Abdomen is soft, nontender, bowel sounds are present, no HSM, no bruits  Lower extremities without edema, no clubbing  1+ pedal and femoral pulses bilaterally  Normal skin turgor, texture, no rashes or lesions?    LABS:  .  Lab Results   Component Value Date    WBC 8.8 07/25/2025    HGB 17.1 07/25/2025    HCT 47.7 07/25/2025    MCV 84.4 07/25/2025    .0 07/25/2025     .  Lab Results   Component Value Date    BUN 16 07/25/2025     07/25/2025    K 3.8 07/25/2025     07/25/2025    CO2 24.0 07/25/2025     .No results found for: \"INR\", \"PROTIME\"  .No components found for: \"CHLPL\"  Lab Results   Component Value Date    HDL 46 12/23/2021    HDL 41 12/28/2020    HDL 46 08/30/2018     Lab Results   Component Value Date     12/23/2021     12/28/2020    LDL 99 08/30/2018     Lab Results   Component Value Date    TRIG 144.00 12/23/2021    TRIG 220.00 (H) 12/28/2020    TRIG 134 08/30/2018     Lab Results   Component Value Date    CHOLHDL 4 12/23/2021    CHOLHDL 5 12/28/2020     .  Lab Results   Component Value Date    ALT 38 12/28/2020    AST 24 12/28/2020     .  Lab Results   Component Value Date    TSH 0.956 12/28/2020          Thank you for allowing me to participate in the care of your patient.    Tonio Bill MD  [facility]    -----------------------------------------------------------------------------         [1]   Past Medical History:   Calculus of kidney    Closed nondisplaced fracture of coronoid process of right ulna, initial encounter    GERD (gastroesophageal reflux disease)    High blood pressure    Lateral epicondylitis of right elbow   [2]   No current facility-administered medications on file prior to encounter.     Current Outpatient Medications on File Prior to Encounter   Medication  Sig Dispense Refill    Multiple Vitamin (MULTI-VITAMIN DAILY) Oral Tab Take 1 tablet by mouth daily.      lisinopril 10 MG Oral Tab Take 1 tablet (10 mg total) by mouth daily. 90 tablet 1    pantoprazole 20 MG Oral Tab EC Take 1 tablet (20 mg total) by mouth every morning before breakfast. 90 tablet 3    Cholecalciferol (VITAMIN D) 50 MCG (2000 UT) Oral Cap Take 1 capsule (2,000 Units total) by mouth in the morning. 30 capsule 11   [3] No Known Allergies  [4]   Family History  Problem Relation Age of Onset    Cancer Father     Hypertension Brother

## 2025-07-25 NOTE — H&P
[facility] Hospitalist H&P       CC:   Chief Complaint   Patient presents with    Arrythmia/Palpitations        PCP: Jillian Helm MD    History of Present Illness: Patient is a 55 year old male with HTN, GERD, history of renal stones who presented with lightheadedness, found to have SVT    Smart watch reported heart rate of 170 after waking feeling unwell at 1 AM, prompting visit to the ED  Patient states she was outside, working in the yard due to his to be daughter-in-law's bridal shower.  States he may have overworked himself, but otherwise felt well and was in his normal health.  Went to bed feeling normal    ED course:  In ED, VS were significant for tachycardia to 163, hypertension 146/121  Initial EKG with SVT, right axis deviation, some nonspecific ST abnormalities in inferior and lateral leads.  Mild ST depression in lateral leads  CBC WNL  BMP WNL  Troponin 8, WNL  Magnesium 1.8, WNL  Patient initially given 6, 12, 12 mg of adenosine followed by 20 mg diltiazem  Metoprolol 5 mg  Subsequent EKG sinus tachycardia with second-degree AV block, resolution of nonspecific T wave abnormality  Cardiology consulted in ED        PMH  Past Medical History[1]     PSH  Past Surgical History[2]     ALL:  Allergies[3]     Home Medications:  Medications Taking[4]      Soc Hx  Social History     Tobacco Use    Smoking status: Former     Current packs/day: 0.00     Types: Cigarettes     Quit date: 2008     Years since quittin.3    Smokeless tobacco: Never   Substance Use Topics    Alcohol use: Yes     Alcohol/week: 6.0 standard drinks of alcohol     Types: 6 Cans of beer per week     Comment: occ        Fam Hx  Family History[5]    Review of Systems  Comprehensive ROS reviewed and negative except for what's stated above.     OBJECTIVE:  /79 (BP Location: Right arm)   Pulse 70   Temp 97.8 °F (36.6 °C) (Oral)   Resp 19   Ht 5' 9\" (1.753 m)   Wt 208 lb 9.6 oz (94.6 kg)   SpO2 95%   BMI  30.80 kg/m²   Physical Exam  Vitals reviewed.   Constitutional:       General: He is not in acute distress.     Appearance: Normal appearance. He is not ill-appearing, toxic-appearing or diaphoretic.   Cardiovascular:      Rate and Rhythm: Normal rate and regular rhythm.      Pulses: Normal pulses.      Heart sounds: Normal heart sounds. No murmur heard.  Pulmonary:      Effort: Pulmonary effort is normal. No respiratory distress.      Breath sounds: Normal breath sounds. No wheezing or rales.   Musculoskeletal:      Right lower leg: No edema.      Left lower leg: No edema.   Skin:     General: Skin is warm and dry.      Capillary Refill: Capillary refill takes less than 2 seconds.   Neurological:      General: No focal deficit present.      Mental Status: He is alert and oriented to person, place, and time.          Diagnostic Data:    CBC/Chem  Recent Labs   Lab 07/25/25  0253   WBC 8.8   HGB 17.1   MCV 84.4   .0       Recent Labs   Lab 07/25/25  0253      K 3.8      CO2 24.0   BUN 16   CREATSERUM 1.15   *   CA 9.8   MG 1.8       No results for input(s): \"ALT\", \"AST\", \"ALB\", \"AMYLASE\", \"LIPASE\", \"LDH\" in the last 168 hours.    Invalid input(s): \"ALPHOS\", \"TBIL\", \"DBIL\", \"TPROT\"    No results for input(s): \"TROP\" in the last 168 hours.      Radiology: No results found.      ASSESSMENT / PLAN:   55 year old male with HTN, GERD,  who presented with lightheadedness, found to have SVT, admitted for further management.    Active problems:     SVT, resolved  Patient initially given 6, 12, 12 mg of adenosine followed by 20 mg diltiazem  Metoprolol 5 mg  Subsequent EKG sinus tachycardia with second-degree AV block, resolution of nonspecific T wave abnormality  Cardiology consulted in ED    Plan:  Cardiology consultation  TTE, follow-up  Metoprolol succinate 25 mg daily, continue to monitor  If recurs consider ablation  Possible discharge today    Hypertension  Lisinopril 10 mg, continue  inpatient    GERD  Pantoprazole 20 mg at home      Chronic problems:     History of renal stones    FN:  - IVF: None ml/hr  - Diet: Regular    DVT Prophy: Enoxaparin  Lines: Peripheral IV    Dispo: Possible discharge today pending clinical course  Code status: Full code    Outpatient records or previous hospital records reviewed.     Further recommendations pending patient's clinical course.  [facility] hospitalist to continue to follow patient while in house    Patient and/or patient's family given opportunity to ask questions and note understanding and agreeing with therapeutic plan as outlined    Thank You,  Elijah Aguilar MD    Hospitalist with [facility]  [employer] Service number: [phone]         [1]   Past Medical History:   Calculus of kidney    Closed nondisplaced fracture of coronoid process of right ulna, initial encounter    GERD (gastroesophageal reflux disease)    High blood pressure    Lateral epicondylitis of right elbow   [2]   Past Surgical History:  Procedure Laterality Date    Egd  11/01/2009    Other surgical history      KIDNEY STONE EXCISION    [3] No Known Allergies  [4]   Outpatient Medications Marked as Taking for the 7/25/25 encounter (Hospital Encounter)   Medication Sig Dispense Refill    Multiple Vitamin (MULTI-VITAMIN DAILY) Oral Tab Take 1 tablet by mouth daily.      lisinopril 10 MG Oral Tab Take 1 tablet (10 mg total) by mouth daily. 90 tablet 1    pantoprazole 20 MG Oral Tab EC Take 1 tablet (20 mg total) by mouth every morning before breakfast. 90 tablet 3    Cholecalciferol (VITAMIN D) 50 MCG (2000 UT) Oral Cap Take 1 capsule (2,000 Units total) by mouth in the morning. 30 capsule 11   [5]   Family History  Problem Relation Age of Onset    Cancer Father     Hypertension Brother

## 2025-07-25 NOTE — CONSULTS
[facility] CARDIOLOGY CONSULT      Joel Segal is a 55 year old male who I assessed as follows:  Chief Complaint   Patient presents with    Arrythmia/Palpitations          REASON FOR CONSULTATION:    SVT            ASSESSMENT/PLAN:     IMPRESSIONS:  SVT, sustainted. No prior history. Could also have been atrial flutter possibly  HTN, on lisinopril        PLAN:  ECGs reviewed  Echo. Ok for discharge if this is ok  Metoprolol succinate 25 daily. Watch for recurrence  If recurrences, discussed AAD/ablation (briefly)            --------------------------------------------------------------------------------------------------------------------------------    HPI:         History HTN woke up from sleep about 01:00 feeling sweaty and clammy. Told wife he did not feel well. Put on Apple watch and HR persistently 170-180. No palpitations, dyspnea, chest pain, syncope.     In ER noted to have SVT. Given multiple rounds adenosine, then IV diltiazem, then IV metoprolol. After metoprolol, HR decreased and in sinus tachycardia (ECGs with 2nd AVB type 1).    No prior heart history. Rare brief palpitations in the past.             Current Medications[1]   Past Medical History[2]  Past Surgical History[3]  Family History[4]   Social History:  Short Social Hx on File[5]       Medications:  Current Hospital Medications[6]        Allergies  Allergies[7]            REVIEW OF SYSTEMS:   GENERAL HEALTH: no fevers  SKIN: denies any unusual skin lesions or rashes   EARS: no recent changes in hearing  EYE: no recent vision changes  THROAT: denies sore throat  RESPIRATORY: denies cough or shortness of breath with exertion  CARDIOVASCULAR: denies chest pain, syncope, or palpitations  GI: denies abdominal pain, nausea and vomiting  NEURO: denies headaches and focal neurologic symptoms  PSYCH: no recent depression  ENDOCRINE: no change in sleep pattern  HEME: no easy bruising  All other systems reviewed and negative.          EXAM:          TELE: SR          /79 (BP Location: Right arm)   Pulse 70   Temp 97.8 °F (36.6 °C) (Oral)   Resp 19   Ht 5' 9\" (1.753 m)   Wt 208 lb 9.6 oz (94.6 kg)   SpO2 95%   BMI 30.80 kg/m²   Temp (24hrs), Av °F (36.7 °C), Min:97.8 °F (36.6 °C), Max:98.1 °F (36.7 °C)    Wt Readings from Last 3 Encounters:   25 208 lb 9.6 oz (94.6 kg)   21 211 lb (95.7 kg)   20 216 lb 3.2 oz (98.1 kg)         Intake/Output Summary (Last 24 hours) at 2025 0611  Last data filed at 2025 0329  Gross per 24 hour   Intake --   Output 350 ml   Net -350 ml         GENERAL: well developed, well nourished, in no apparent distress  SKIN: no rashes  HEENT: atraumatic, normocephalic, throat without erythema  NECK: supple, no bruits  LUNGS: clear to auscultation  CARDIO: RRR without murmur or S3   GI: soft, nontender  EXTREMITIES: no cyanosis, clubbing or edema  NEUROLOGY: alert  PSYCH: cooperative          LABORATORY DATA:               ECG:        ECHO:          Labs:  Recent Labs   Lab 25   *   BUN 16   CREATSERUM 1.15   CA 9.8      K 3.8      CO2 24.0     No results for input(s): \"TROP\" in the last 168 hours.  Recent Labs   Lab 25  0253   RBC 5.65   HGB 17.1   HCT 47.7   MCV 84.4   MCH 30.3   MCHC 35.8   RDW 12.3   NEPRELIM 3.85   WBC 8.8   .0     Recent Labs   Lab 25  0253   TROPHS 8       Imaging:  No results found.         North Lawson MD          [1]   No current outpatient medications on file.   [2]   Past Medical History:   Calculus of kidney    Closed nondisplaced fracture of coronoid process of right ulna, initial encounter    GERD (gastroesophageal reflux disease)    High blood pressure    Lateral epicondylitis of right elbow   [3]   Past Surgical History:  Procedure Laterality Date    Egd  2009    Other surgical history      KIDNEY STONE EXCISION    [4]   Family History  Problem Relation Age of Onset    Cancer Father      Hypertension Brother    [5]   Social History  Socioeconomic History    Marital status:    Tobacco Use    Smoking status: Former     Current packs/day: 0.00     Types: Cigarettes     Quit date: 2008     Years since quittin.3    Smokeless tobacco: Never   Vaping Use    Vaping status: Never Used   Substance and Sexual Activity    Alcohol use: Yes     Alcohol/week: 6.0 standard drinks of alcohol     Types: 6 Cans of beer per week     Comment: occ    Drug use: No     Social Drivers of Health     Food Insecurity: No Food Insecurity (2025)    NCSS - Food Insecurity     Worried About Running Out of Food in the Last Year: No     Ran Out of Food in the Last Year: No   Transportation Needs: No Transportation Needs (2025)    NCSS - Transportation     Lack of Transportation: No   Housing Stability: Not At Risk (2025)    NCSS - Housing/Utilities     Has Housing: Yes     Worried About Losing Housing: No     Unable to Get Utilities: No   [6]   Current Facility-Administered Medications   Medication Dose Route Frequency    lisinopril (Zestril) tab 10 mg  10 mg Oral Daily    pantoprazole (Protonix) DR tab 40 mg  40 mg Oral QAM AC    enoxaparin (Lovenox) 40 MG/0.4ML SUBQ injection 40 mg  40 mg Subcutaneous Daily    acetaminophen (Tylenol) tab 650 mg  650 mg Oral Q4H PRN    Or    HYDROcodone-acetaminophen (Norco) 5-325 MG per tab 1 tablet  1 tablet Oral Q4H PRN    Or    HYDROcodone-acetaminophen (Norco) 5-325 MG per tab 2 tablet  2 tablet Oral Q4H PRN    melatonin tab 3 mg  3 mg Oral Nightly PRN   [7] No Known Allergies

## 2025-07-25 NOTE — ED QUICK NOTES
Pt states doing strenuous physical activity yesterday to get ready for a family party that is upcoming this weekend. Pt went to sleep and felt his heart beating fast. He has not experienced anything like this before. No previous cardiac history. Pt does state feeling of palpitations some time ago but had resolved on its own.

## 2025-07-25 NOTE — ED PROVIDER NOTES
Patient Seen in: [facility] Emergency Department    History     Chief Complaint   Patient presents with    Arrythmia/Palpitations       HPI    55-year-old male presents ER with complaint of rapid heart rate.   Patient states that he woke up flushed in the hot and felt lightheaded.  Patient Nuys any chest pain or shortness of breath.  Patient put on his Apple Watch and found to have a heart rate of 170.    History reviewed. Past Medical History[1]    History reviewed. Past Surgical History[2]      Medications :  Prescriptions Prior to Admission[3]     Family History[4]    Smoking Status: Social Hx on file[5]    ROS  All pertinent positives for the review of systems are mentioned in the HPI  All other organ systems are reviewed and are negative.    Constitutional and vital signs reviewed.      Social History and Family History elements reviewed from today, pertinent positives to the presenting problem noted.    Physical Exam     ED Triage Vitals   BP 07/25/25 0239 (!) 146/121   Pulse 07/25/25 0239 (!) 163   Resp 07/25/25 0306 24   Temp 07/25/25 0239 98.1 °F (36.7 °C)   Temp src 07/25/25 0239 Oral   SpO2 07/25/25 0239 98 %   O2 Device 07/25/25 0239 None (Room air)       All measures to prevent infection transmission during my interaction with the patient were taken. The patient was already wearing a droplet mask on my arrival to the room. Personal protective equipment including droplet mask, eye protection, and gloves were worn throughout the duration of the exam.  Handwashing was performed prior to and after the exam.  Stethoscope and any equipment used during my examination was cleaned with super sani-cloth germicidal wipes following the exam.     Physical Exam  Vitals and nursing note reviewed.   Constitutional:       Appearance: He is well-developed.   HENT:      Head: Normocephalic and atraumatic.      Right Ear: External ear normal.      Left Ear: External ear normal.      Nose: Nose normal.   Eyes:       Conjunctiva/sclera: Conjunctivae normal.      Pupils: Pupils are equal, round, and reactive to light.   Cardiovascular:      Rate and Rhythm: Regular rhythm. Tachycardia present.      Heart sounds: Normal heart sounds.   Pulmonary:      Effort: Pulmonary effort is normal.      Breath sounds: Normal breath sounds.   Abdominal:      General: Bowel sounds are normal.      Palpations: Abdomen is soft.   Musculoskeletal:         General: Normal range of motion.      Cervical back: Normal range of motion and neck supple.   Skin:     General: Skin is warm and dry.   Neurological:      General: No focal deficit present.      Mental Status: He is alert and oriented to person, place, and time.      Deep Tendon Reflexes: Reflexes are normal and symmetric.   Psychiatric:         Behavior: Behavior normal.         Thought Content: Thought content normal.         Judgment: Judgment normal.         ED Course        Labs Reviewed   BASIC METABOLIC PANEL (8) - Abnormal; Notable for the following components:       Result Value    Glucose 128 (*)     All other components within normal limits   TROPONIN I HIGH SENSITIVITY - Normal   MAGNESIUM - Normal   CBC WITH DIFFERENTIAL WITH PLATELET     EKG    Rate, intervals and axes as noted on EKG Report.  Rate: 166  Rhythm: SVT  Reading: SVT, no ST deviation, normal axis    2nd EKG    Rate, intervals and axes as noted on EKG Report.  Rate: 123  Rhythm: Sinus Rhythm  Reading: No ST deviation, normal axis.    3rd EKG    Rate, intervals and axes as noted on EKG Report.  Rate: 112  Rhythm: Sinus Rhythm  Reading: PACs, no ST deviation, normal axis                                 Imaging Results Available and Reviewed while in ED: No results found.  ED Medications Administered:   Medications   metoprolol tartrate (Lopressor) tab 25 mg (has no administration in time range)   adenosine (Adenocard) 6 mg/2mL injection 6 mg (6 mg Intravenous Given 7/25/25 0256)   adenosine (Adenocard) 6 mg/2mL injection  12 mg (12 mg Intravenous Given 7/25/25 0258)   adenosine (Adenocard) 6 mg/2mL injection 12 mg (12 mg Intravenous Given 7/25/25 0301)   dilTIAZem (cardIZEM) 25 mg/5mL injection 20 mg (20 mg Intravenous Given 7/25/25 0303)   metoprolol (Lopressor) 5 mg/5mL injection 5 mg (5 mg Intravenous Given 7/25/25 0310)         MDM     Vitals:    07/25/25 0306 07/25/25 0314 07/25/25 0317 07/25/25 0328   BP:  (!) 155/99  131/73   Pulse:  (!) 124 86 63   Resp: 24 17     Temp:       TempSrc:       SpO2:  98%       *I personally reviewed and interpreted all ED vitals.  I also personally reviewed all labs and imaging if ordered    Pulse Ox: 98%, Room air, Normal     Monitor Interpretation:   normal sinus rhythm    Differential Diagnosis/ Diagnostic Considerations: SVT, electrolyte abnormality, cardiac arrhythmia.    Medical Record Review: I personally reviewed available prior medical records for any recent pertinent discharge summaries, testing, and procedures and reviewed those reports.    Complicating Factors: The patient already has does not have any pertinent problems on file. to contribute to the complexity of this ED evaluation.    Medical Decision Making  55-year-old male presents ER with complaint of elevated heart rate.  Patient found to be in SVT with heart rate of 167.  Patient given adenosine, and Cardizem with no reduction in heart rate.  Patient was then given metoprolol 5 mg IV push and heart rate improved to .  Patient denies any chest pain or shortness of breath.  Discussed with [facility] who states to give the patient metoprolol 25 mg p.o.  [facility] hospitalist notified of the admission.  Patient's wife at bedside made aware of the disposition admission.    Total Critical Care Time: 40 minutes including time spent examining and re-evaluating the patient, ordering and reviewing laboratory tests, documenting, reviewing previous records, obtaining information from the family, and speaking with consultants,  admitting doctors, nurses and medics and excludes any time spent on procedures.      Problems Addressed:  SVT (supraventricular tachycardia) (HCC): acute illness or injury    Amount and/or Complexity of Data Reviewed  Independent Historian:      Details: Medical history obtained from the spouse bedside states he woke up with elevated heart rate and was sweaty but denies any chest pain.  Labs: ordered. Decision-making details documented in ED Course.        Condition upon leaving the department: Stable    Disposition and Plan     Clinical Impression:  1. SVT (supraventricular tachycardia) (HCC)        Disposition:  Admit    Follow-up:  No follow-up provider specified.    Medications Prescribed:  Current Discharge Medication List          Hospital Problems       Present on Admission  Date Reviewed: 3/9/2023          ICD-10-CM Noted POA    * (Principal) SVT (supraventricular tachycardia) (HCC) I47.10 2025 Unknown               [1]   Past Medical History:   Calculus of kidney    Closed nondisplaced fracture of coronoid process of right ulna, initial encounter    GERD (gastroesophageal reflux disease)    High blood pressure    Lateral epicondylitis of right elbow   [2]   Past Surgical History:  Procedure Laterality Date    Egd      Other surgical history      KIDNEY STONE EXCISION    [3] (Not in a hospital admission)   [4]   Family History  Problem Relation Age of Onset    Cancer Father     Hypertension Brother    [5]   Social History  Socioeconomic History    Marital status:    Tobacco Use    Smoking status: Former     Current packs/day: 0.00     Types: Cigarettes     Quit date: 2008     Years since quittin.3    Smokeless tobacco: Never   Vaping Use    Vaping status: Never Used   Substance and Sexual Activity    Alcohol use: Yes     Alcohol/week: 6.0 standard drinks of alcohol     Types: 6 Cans of beer per week     Comment: occ    Drug use: No

## 2025-07-25 NOTE — DISCHARGE PLANNING
Pt ready and cleared for discharge. IV and tele removed. Pt education and paperwork provided. All questions answered. Pt walked downstairs and driven home by wife.     Pt called after discharge. Cards group in dc paperwork verbally corrected to Duly and office # provided.

## 2025-07-25 NOTE — ED QUICK NOTES
Orders for admission, patient is aware of plan and ready to go upstairs. Any questions, please call ED RN Juan at extension [phone].     Patient Covid vaccination status: Unvaccinated     COVID Test Ordered in ED: None    COVID Suspicion at Admission: N/A    Running Infusions: Medication Infusions[1] None    Mental Status/LOC at time of transport: A+Ox4    Other pertinent information: new arrhythmia   CIWA score: N/A   NIH score:  N/A             [1]

## 2025-07-25 NOTE — PLAN OF CARE
Pt alert and oriented x4. Independent. Echo completed. Potassium and mag replaced per protocol. Pt updated on plan of care. Plan to discharge when medically cleared. Safety precautions in place. Call light within reach.    Problem: Patient Centered Care  Goal: Patient preferences are identified and integrated in the patient's plan of care  Description: Interventions:  - What would you like us to know as we care for you?  - Provide timely, complete, and accurate information to patient/family  - Incorporate patient and family knowledge, values, beliefs, and cultural backgrounds into the planning and delivery of care  - Encourage patient/family to participate in care and decision-making at the level they choose  - Honor patient and family perspectives and choices  Outcome: Progressing     Problem: CARDIOVASCULAR - ADULT  Goal: Maintains optimal cardiac output and hemodynamic stability  Description: INTERVENTIONS:  - Monitor vital signs, rhythm, and trends  - Monitor for bleeding, hypotension and signs of decreased cardiac output  - Evaluate effectiveness of vasoactive medications to optimize hemodynamic stability  - Monitor arterial and/or venous puncture sites for bleeding and/or hematoma  - Assess quality of pulses, skin color and temperature  - Assess for signs of decreased coronary artery perfusion - ex. Angina  - Evaluate fluid balance, assess for edema, trend weights  Outcome: Progressing  Goal: Absence of cardiac arrhythmias or at baseline  Description: INTERVENTIONS:  - Continuous cardiac monitoring, monitor vital signs, obtain 12 lead EKG if indicated  - Evaluate effectiveness of antiarrhythmic and heart rate control medications as ordered  - Initiate emergency measures for life threatening arrhythmias  - Monitor electrolytes and administer replacement therapy as ordered  Outcome: Progressing     Problem: Patient/Family Goals  Goal: Patient/Family Long Term Goal  Description: Patient's Long Term Goal:  discharge    Interventions:  - Monitor vital signs  - Echo  - See additional Care Plan goals for specific interventions  Outcome: Progressing  Goal: Patient/Family Short Term Goal  Description: Patient's Short Term Goal: feel better    Interventions:   - Ambulate as tolerated  - Continue ADLs  - See additional Care Plan goals for specific interventions  Outcome: Progressing

## 2025-07-25 NOTE — PLAN OF CARE
Bed locked in low position, belongings in reach, call light in reach. Frequent rounding done, all patient needs met. Non-slip socks on/at bedside. No complaints of pain this shift. Plan for Echo today    Problem: Patient Centered Care  Goal: Patient preferences are identified and integrated in the patient's plan of care  Description: Interventions:  - What would you like us to know as we care for you? I hope to DC today, we're having a family get together this weekend.  - Provide timely, complete, and accurate information to patient/family  - Incorporate patient and family knowledge, values, beliefs, and cultural backgrounds into the planning and delivery of care  - Encourage patient/family to participate in care and decision-making at the level they choose  - Honor patient and family perspectives and choices  Outcome: Progressing     Problem: CARDIOVASCULAR - ADULT  Goal: Maintains optimal cardiac output and hemodynamic stability  Description: INTERVENTIONS:  - Monitor vital signs, rhythm, and trends  - Monitor for bleeding, hypotension and signs of decreased cardiac output  - Evaluate effectiveness of vasoactive medications to optimize hemodynamic stability  - Monitor arterial and/or venous puncture sites for bleeding and/or hematoma  - Assess quality of pulses, skin color and temperature  - Assess for signs of decreased coronary artery perfusion - ex. Angina  - Evaluate fluid balance, assess for edema, trend weights  Outcome: Progressing  Goal: Absence of cardiac arrhythmias or at baseline  Description: INTERVENTIONS:  - Continuous cardiac monitoring, monitor vital signs, obtain 12 lead EKG if indicated  - Evaluate effectiveness of antiarrhythmic and heart rate control medications as ordered  - Initiate emergency measures for life threatening arrhythmias  - Monitor electrolytes and administer replacement therapy as ordered  Outcome: Progressing

## 2025-07-25 NOTE — DISCHARGE SUMMARY
Hospitalist Discharge Summary    Admission Date/Time  2025  2:43 AM  Discharge Date  25    PCP  Jillian Helm MD     Discharging Hospitalist:  Elijah Aguilar MD    Disposition:  ***    Follow Up Appointments  [facility]  [address]  [facility] [address] [phone]  Schedule an appointment as soon as possible for a visit in 2 week(s)      Jillian Helm MD  [address] [place] [address] [phone]    Follow up in 3 day(s)      Appointments to be made by PCP: ***    Primary Hospital Problems/Hospital Course Summary  ***    Medication Changes  ***    Important Follow Up Items  Labs:  ***  Imaging: ***  Medications: ***  Incidental findings: ***    Procedures/Diagnostics  ***    Change in Code Status: ***    Discharge Medications       Medication List        START taking these medications      metoprolol succinate ER 25 MG Tb24  Commonly known as: Toprol XL  Take 1 tablet (25 mg total) by mouth Daily Beta Blocker.  Start taking on: 2025            CONTINUE taking these medications      lisinopril 10 MG Tabs  Commonly known as: Zestril  Take 1 tablet (10 mg total) by mouth daily.     Multi-Vitamin Daily Tabs     pantoprazole 20 MG Tbec  Commonly known as: Protonix  Take 1 tablet (20 mg total) by mouth every morning before breakfast.     Vitamin D 50 MCG ( UT) Caps               Where to Get Your Medications        These medications were sent to [id number] - [place], [place] - [address] [facility] [address], [phone], [phone]  [address], [place] [address]      Phone: [phone]   metoprolol succinate ER 25 MG Tb24       I reconciled current and discharge medications on the day of discharge.    Imaging/Diagnostic Reports  CARD ECHO 2D DOPPLER (CPT=93306)  Result Date: 2025  Transthoracic Echocardiogram Name:Joel Segal Date: 2025 :  1970 Ht:  (69in)  BP: 111 / 76 MRN:   [id number]    Age:  55years    Wt:  (208lb) HR: 65bpm Loc:  [facility]       Gndr: M          BSA: 2.1m^2 Sonographer: Shima Vuong Ordering:    Real Carmona Consulting:  Dick Trivedi ---------------------------------------------------------------------------- Procedure information:  A transthoracic complete 2D study was performed. Additional evaluation included M-mode, complete spectral Doppler, and color Doppler.  Patient status:  Inpatient.  Location:  Bedside.    No prior study was available for comparison.    This was a routine study. Transthoracic echocardiography for diagnosis, ventricular function evaluation, and assessment of valvular function. Image quality was adequate. ECG rhythm:   Normal sinus ---------------------------------------------------------------------------- Conclusions: 1. Study data: Normal sinus 2. Left ventricle: The cavity size was normal. Wall thickness was normal.    Systolic function was normal. The estimated ejection fraction was 60%, by    3D assessment. No diagnostic evidence for regional wall motion    abnormalities. Left ventricular diastolic function parameters were    normal. 3. Left atrium: The left atrial volume was normal. Impressions:  No previous study was available for comparison. * ---------------------------------------------------------------------------- * Findings: Left ventricle:  The cavity size was normal. Wall thickness was normal. Systolic function was normal. The estimated ejection fraction was 60%, by 3D assessment. No diagnostic evidence for regional wall motion abnormalities. Left ventricular diastolic function parameters were normal. Left atrium:  The left atrial volume was normal. Right ventricle:  The cavity size was normal. Systolic function was normal. Right atrium:  The atrium was normal in size. Mitral valve:  The valve was structurally normal. Leaflet separation was normal.  Doppler:  Transvalvular velocity was within the normal range. There was no  evidence for stenosis. There was no significant regurgitation.    The valve area by pressure half-time was 3.49cm^2. The valve area index by pressure half-time was 1.66cm^2/m^2. Aortic valve:  The valve was structurally normal. The valve was trileaflet. Cusp separation was normal.  Doppler:  Transvalvular velocity was within the normal range. There was no evidence for stenosis. There was trivial regurgitation. Tricuspid valve:  The valve is structurally normal. Leaflet separation was normal.  Doppler:  Transvalvular velocity was within the normal range. There was no evidence for stenosis. There was trivial regurgitation. Pulmonic valve:   The valve is structurally normal. Cusp separation was normal.  Doppler:  Transvalvular velocity was within the normal range. There was no evidence for stenosis. There was no significant regurgitation. Pericardium:   There was no pericardial effusion. Aorta: Aortic root: The aortic root was normal. Ascending aorta: The ascending aorta was normal. Pulmonary arteries: Systolic pressure could not be accurately estimated. ---------------------------------------------------------------------------- Measurements  Left ventricle                   Value          Ref  LV end-diastolic volume, 3D      177   ml       --------  LV end-systolic volume, 3D       72    ml       --------  LV ejection fraction, 3D         60    %        52 - 72  LV end-diastolic volume/bsa, (H) 84    ml/m^2   <=79  3D  LV end-systolic volume/bsa,  (H) 34    ml/m^2   <=32  3D  LV wall mass, 3D                 162   g        --------  LV wall mass/bsa, 3D             77    g/m^2    --------  IVS thickness, ED, PLAX          1.0   cm       0.6 -                                                  1.0  LV ID, ED, PLAX                  4.7   cm       4.2 -                                                  5.8  LV ID, ES, PLAX                  3.2   cm       2.5 -                                                  4.0  LV PW  thickness, ED, PLAX        1.0   cm       0.6 -                                                  1.0  IVS/LV PW ratio, ED, PLAX        1.00           --------  LV PW/LV ID ratio, ED, PLAX      0.21           --------  LV ejection fraction             60    %        52 - 72  Stroke volume/bsa, 2D            27    ml/m^2   --------  LV e', lateral                   11.7  cm/sec   >=10.0  LV E/e', lateral                 6              <=13  LV e', medial                    9.6   cm/sec   >=7.0  LV E/e', medial                  7              --------  LV e', average                   10.6  cm/sec   --------  LV E/e', average                 6              <=14  LVOT                             Value          Ref  LVOT ID                          2     cm       --------  LVOT peak velocity, S            0.87  m/sec    --------  LVOT VTI, S                      18.0  cm       --------  LVOT peak gradient, S            3     mm Hg    --------  LVOT mean gradient, S            2     mm Hg    --------  Stroke volume (SV), LVOT DP      57    ml       --------  Stroke index (SV/bsa), LVOT      27    ml/m^2   --------  DP  Aortic root                      Value          Ref  Aortic root ID                   3.6   cm       2.7 -                                                  4.2  Ascending aorta                  Value          Ref  Ascending aorta ID               3.4   cm       2.2 -                                                  3.8  Left atrium                      Value          Ref  LA ID, A-P, ES               (H) 4.1   cm       3.0 -                                                  4.0  LA volume, S                 (H) 60    ml       18 - 58  LA volume/bsa, S                 29    ml/m^2   16 - 34  LA volume, ES, 1-p A4C           57    ml       18 - 58  LA volume, ES, 1-p A2C           56    ml       18 - 58  LA volume, ES, A/L               63    ml       --------  LA volume/bsa, ES, A/L           30    ml/m^2    16 - 34  LA/aortic root ratio             1.14           --------  LA volume, ES, 3D            (H) 65    ml       18 - 58  LA volume/bsa, ES, 3D            31    ml/m^2   --------  Mitral valve                     Value          Ref  Mitral E-wave peak velocity      0.67  m/sec    --------  Mitral A-wave peak velocity      0.45  m/sec    --------  Mitral deceleration time         214   ms       --------  Mitral pressure half-time        63    ms       --------  Mitral E/A ratio, peak           1.5            --------  Mitral valve area, PHT, DP       3.49  cm^2     --------  Mitral valve area/bsa, PHT,      1.66  cm^2/m^2 --------  DP  Right ventricle                  Value          Ref  TAPSE, MM                        1.83  cm       >=1.70  RV s', lateral                   10.2  cm/sec   >=9.5 Legend: (L)  and  (H)  trores values outside specified reference range. ---------------------------------------------------------------------------- Prepared and electronically signed by North Lawson 07/25/2025 12:11       Secondary Discharge Diagnoses  ***    Pertinent Physical Exam At Time of Discharge  Vitals:    07/25/25 0415 07/25/25 0440 07/25/25 0452 07/25/25 0920   BP: 112/83  111/79 111/76   BP Location:   Right arm Right arm   Pulse: 71 71 70 76   Resp: 17  19 18   Temp:   97.8 °F (36.6 °C) 97.4 °F (36.3 °C)   TempSrc:   Oral Oral   SpO2: 96%  95% 97%   Weight:   208 lb 9.6 oz (94.6 kg)    Height:   5' 9\" (1.753 m)         General: no acute distress  Heart: RRR  Lungs: CTAB  Abd: Soft, NT, ND  Neuro: no focal deficits    Total time coordinating care > 30 mins.    Patient had opportunity to ask questions and stated understanding and agreed with therapeutic plan as outlined.     Elijah Aguilar MD  7/25/2025   ---------------------------------------------------------------------------- Prepared and electronically signed by North Lawson 07/25/2025 12:11       Secondary Discharge Diagnoses  None    Pertinent Physical Exam At Time of Discharge  Vitals:    07/25/25 0415 07/25/25 0440 07/25/25 0452 07/25/25 0920   BP: 112/83  111/79 111/76   BP Location:   Right arm Right arm   Pulse: 71 71 70 76   Resp: 17  19 18   Temp:   97.8 °F (36.6 °C) 97.4 °F (36.3 °C)   TempSrc:   Oral Oral   SpO2: 96%  95% 97%   Weight:   208 lb 9.6 oz (94.6 kg)    Height:   5' 9\" (1.753 m)         General: no acute distress  Heart: RRR  Lungs: CTAB  Abd: Soft, NT, ND  Neuro: no focal deficits    Total time coordinating care > 30 mins.    Patient had opportunity to ask questions and stated understanding and agreed with therapeutic plan as outlined.     Elijah Aguilar MD  7/25/2025

## 2025-07-28 NOTE — PAYOR COMM NOTE
--------------  ADMISSION REVIEW     Payor: RACHELL PATHAK POS/MCNP  Subscriber #:  [id number]  Authorization Number: [id number]    Admit date: 7/25/25  Admit time:  4:26 AM       ED Provider Notes        History   HPI  55-year-old male presents ER with complaint of rapid heart rate.   Patient states that he woke up flushed in the hot and felt lightheaded.  Patient Nuys any chest pain or shortness of breath.  Patient put on his Apple Watch and found to have a heart rate of 170.    ED Triage Vitals   BP 07/25/25 0239 (!) 146/121   Pulse 07/25/25 0239 (!) 163   Resp 07/25/25 0306 24   Temp 07/25/25 0239 98.1 °F (36.7 °C)   Temp src 07/25/25 0239 Oral   SpO2 07/25/25 0239 98 %   O2 Device 07/25/25 0239 None (Room air)   HENT:      Head: Normocephalic and atraumatic.      Right Ear: External ear normal.      Left Ear: External ear normal.      Nose: Nose normal.   Eyes:      Conjunctiva/sclera: Conjunctivae normal.      Pupils: Pupils are equal, round, and reactive to light.   Cardiovascular:      Rate and Rhythm: Regular rhythm. Tachycardia present.      Heart sounds: Normal heart sounds.   Pulmonary:      Effort: Pulmonary effort is normal.      Breath sounds: Normal breath sounds.   Abdominal:      General: Bowel sounds are normal.      Palpations: Abdomen is soft.   Musculoskeletal:         General: Normal range of motion.      Cervical back: Normal range of motion and neck supple.   Skin:     General: Skin is warm and dry.   Neurological:      General: No focal deficit present.      Mental Status: He is alert and oriented to person, place, and time.      Deep Tendon Reflexes: Reflexes are normal and symmetric.   Psychiatric:         Behavior: Behavior normal.         Thought Content: Thought content normal.         Judgment: Judgment normal.     Labs Reviewed   BASIC METABOLIC PANEL (8) - Abnormal; Notable for the following components:       Result Value    Glucose 128 (*)    EKG    Rate, intervals and axes as noted on  EKG Report.  Rate: 166  Rhythm: SVT  Reading: SVT, no ST deviation, normal axis    2nd EKG    Rate, intervals and axes as noted on EKG Report.  Rate: 123  Rhythm: Sinus Rhythm  Reading: No ST deviation, normal axis.    3rd EKG    Rate, intervals and axes as noted on EKG Report.  Rate: 112  Rhythm: Sinus Rhythm  Reading: PACs, no ST deviation, normal axis    ED Medications Administered:   Medications   metoprolol tartrate (Lopressor) tab 25 mg (has no administration in time range)   adenosine (Adenocard) 6 mg/2mL injection 6 mg (6 mg Intravenous Given 7/25/25 0256)   adenosine (Adenocard) 6 mg/2mL injection 12 mg (12 mg Intravenous Given 7/25/25 0258)   adenosine (Adenocard) 6 mg/2mL injection 12 mg (12 mg Intravenous Given 7/25/25 0301)   dilTIAZem (cardIZEM) 25 mg/5mL injection 20 mg (20 mg Intravenous Given 7/25/25 0303)   metoprolol (Lopressor) 5 mg/5mL injection 5 mg (5 mg Intravenous Given 7/25/25 0310)       Vitals:    07/25/25 0306 07/25/25 0314 07/25/25 0317 07/25/25 0328   BP:  (!) 155/99  131/73   Pulse:  (!) 124 86 63   Resp: 24 17     SpO2:  98%       Disposition and Plan     Clinical Impression:  1. SVT (supraventricular tachycardia) (HCC)      Disposition:  Admit    H&P         CC:       Chief Complaint   Patient presents with    Arrythmia/Palpitations         PCP: Jillian Helm MD     History of Present Illness: Patient is a 55 year old male with HTN, GERD, history of renal stones who presented with lightheadedness, found to have SVT     Smart watch reported heart rate of 170 after waking feeling unwell at 1 AM, prompting visit to the ED  Patient states she was outside, working in the yard due to his to be daughter-in-law's bridal shower.  States he may have overworked himself, but otherwise felt well and was in his normal health.  Went to bed feeling normal     ED course:  In ED, VS were significant for tachycardia to 163, hypertension 146/121  Initial EKG with SVT, right axis deviation, some  nonspecific ST abnormalities in inferior and lateral leads.  Mild ST depression in lateral leads  CBC WNL  BMP WNL  Troponin 8, WNL  Magnesium 1.8, WNL  Patient initially given 6, 12, 12 mg of adenosine followed by 20 mg diltiazem  Metoprolol 5 mg  Subsequent EKG sinus tachycardia with second-degree AV block, resolution of nonspecific T wave abnormality  Cardiology consulted in ED      /79 (BP Location: Right arm)   Pulse 70   Temp 97.8 °F (36.6 °C) (Oral)   Resp 19   Ht 5' 9\" (1.753 m)   Wt 208 lb 9.6 oz (94.6 kg)   SpO2 95%   BMI 30.80 kg/m²     Cardiovascular:      Rate and Rhythm: Normal rate and regular rhythm.      Pulses: Normal pulses.      Heart sounds: Normal heart sounds. No murmur heard.  Pulmonary:      Effort: Pulmonary effort is normal. No respiratory distress.      Breath sounds: Normal breath sounds. No wheezing or rales.   Musculoskeletal:      Right lower leg: No edema.      Left lower leg: No edema.   Skin:     General: Skin is warm and dry.      Capillary Refill: Capillary refill takes less than 2 seconds.   Neurological:      General: No focal deficit present.      Mental Status: He is alert and oriented to person, place, and time.      Lab 07/25/25  0253   WBC 8.8   HGB 17.1   MCV 84.4   .0      Lab 07/25/25  0253      K 3.8      CO2 24.0   BUN 16   CREATSERUM 1.15   *   CA 9.8   MG 1.8       ASSESSMENT / PLAN:   55 year old male with HTN, GERD,  who presented with lightheadedness, found to have SVT, admitted for further management.     Active problems:      SVT, resolved  Patient initially given 6, 12, 12 mg of adenosine followed by 20 mg diltiazem  Metoprolol 5 mg  Subsequent EKG sinus tachycardia with second-degree AV block, resolution of nonspecific T wave abnormality  Cardiology consulted in ED     Plan:  Cardiology consultation  TTE, follow-up  Metoprolol succinate 25 mg daily, continue to monitor  If recurs consider ablation  Possible discharge  today     Hypertension  Lisinopril 10 mg, continue inpatient     GERD  Pantoprazole 20 mg at home        Chronic problems:      History of renal stones     FN:  - IVF: None ml/hr  - Diet: Regular     DVT Prophy: Enoxaparin      DATE OF DISCHARGE: 7/25/25        Vitals (last day) before discharge       Date/Time Temp Pulse Resp BP SpO2 Weight O2 Device O2 Flow Rate (L/min) [facility]    07/25/25 0920 97.4 °F (36.3 °C) 76 18 111/76 97 % -- None (Room air) -- SD    07/25/25 0452 97.8 °F (36.6 °C) 70 19 111/79 95 % 208 lb 9.6 oz (94.6 kg) None (Room air) -- LT    07/25/25 0440 -- 71 -- -- -- -- -- -- KD    07/25/25 0415 -- 71 17 112/83 96 % -- None (Room air) --     07/25/25 0345 -- 67 14 112/85 98 % -- None (Room air) --     07/25/25 0328 -- 63 -- 131/73 -- -- -- --     07/25/25 0317 -- 86 -- -- -- -- -- --     07/25/25 0314 -- 124 17 155/99 98 % -- None (Room air) --     07/25/25 0306 -- -- 24 -- -- -- -- -- AI    07/25/25 0239 98.1 °F (36.7 °C) 163 -- 146/121 98 % -- None (Room air) -- STARR